# Patient Record
(demographics unavailable — no encounter records)

---

## 2019-12-02 NOTE — NUR
C/O CONTINUOUS DEPRESSION AND CP WITH ANXIETY. PATIENT STATES SHE RECENTLY LOST 
HER JOB AND HAD TO MOVE IN WITH HER SON. DENIES SUICIDAL IDEATION, DENIES 
WANTING TO HARM OTHERS.

PT STATES SHE HAS NOT BEEN ABLE TO TAKE HER PSYCH MEDICATIONS DUE TO NO 
INSURANCE. USUALLY TAKES SEROQUEL, PROZAC, AND XANAX.

PT ADDS BILTERAL KNEE PAIN 8/10 DUE TO ARTHRITIS. PT ALERT AND AWAKE, CRYING 
UPON TRIAGE. VS STABLE. BED IS DOWN, LOCKED, BED RAILX 1.



PMH- ANXIETY, DEPRESSION, THYROID DISEASE

RX- LEVOTHYROXINE, ARTHRITIS

## 2019-12-02 NOTE — NUR
Patient discharged. Written and verbal after care instructions given and 
explained REGARDING DEPRESSION. 

Patient alert, oriented and verbalized understanding of instructions. 
Ambulatory with steady gait. All questions addressed prior to discharge. ID 
band removed. Patient advised to follow up with PMD. Rx of SEROQUEL, 
LEVOTHYROXINE, LISINOPRIL, AND NAPROSYN given. Patient educated on indication 
of medication including possible reaction and side effects. Opportunity to ask 
questions provided and answered.

PT INSTRUCTED TOT MADONNA NAPROSYN WITH A MEAL AND TO TAKE LEVOTHYROXINE IN THE 
MORNING BEFORE A MEAL.

PT GIVEN MENTAL HEALTH RESOURCE PACKET